# Patient Record
Sex: MALE | URBAN - METROPOLITAN AREA
[De-identification: names, ages, dates, MRNs, and addresses within clinical notes are randomized per-mention and may not be internally consistent; named-entity substitution may affect disease eponyms.]

---

## 2019-10-09 ENCOUNTER — EMERGENCY (EMERGENCY)
Facility: HOSPITAL | Age: 24
LOS: 1 days | End: 2019-10-09
Attending: EMERGENCY MEDICINE

## 2019-10-09 VITALS
HEIGHT: 68 IN | OXYGEN SATURATION: 99 % | HEART RATE: 59 BPM | DIASTOLIC BLOOD PRESSURE: 63 MMHG | SYSTOLIC BLOOD PRESSURE: 110 MMHG | RESPIRATION RATE: 16 BRPM | TEMPERATURE: 98 F | WEIGHT: 184.97 LBS

## 2019-10-09 NOTE — ED ADULT NURSE NOTE - OBJECTIVE STATEMENT
patient a/o x 4 with a calm affect states that he may or may not have had sexual intercourse with a partner who possibly had herpes and wants to know if he has them.  patient has no current outbreak

## 2020-04-15 ENCOUNTER — TRANSCRIPTION ENCOUNTER (OUTPATIENT)
Age: 25
End: 2020-04-15

## 2021-03-16 ENCOUNTER — TRANSCRIPTION ENCOUNTER (OUTPATIENT)
Age: 26
End: 2021-03-16

## 2021-05-12 ENCOUNTER — TRANSCRIPTION ENCOUNTER (OUTPATIENT)
Age: 26
End: 2021-05-12

## 2023-12-23 ENCOUNTER — EMERGENCY (EMERGENCY)
Facility: HOSPITAL | Age: 28
LOS: 1 days | Discharge: HOME | End: 2023-12-23
Attending: EMERGENCY MEDICINE | Admitting: EMERGENCY MEDICINE
Payer: COMMERCIAL

## 2023-12-23 VITALS
SYSTOLIC BLOOD PRESSURE: 110 MMHG | TEMPERATURE: 98 F | OXYGEN SATURATION: 98 % | DIASTOLIC BLOOD PRESSURE: 61 MMHG | RESPIRATION RATE: 14 BRPM | HEART RATE: 75 BPM

## 2023-12-23 VITALS
TEMPERATURE: 98 F | HEART RATE: 75 BPM | RESPIRATION RATE: 16 BRPM | HEIGHT: 69 IN | WEIGHT: 199.96 LBS | SYSTOLIC BLOOD PRESSURE: 108 MMHG | DIASTOLIC BLOOD PRESSURE: 66 MMHG | OXYGEN SATURATION: 98 %

## 2023-12-23 LAB
ALBUMIN SERPL ELPH-MCNC: 3.9 G/DL — SIGNIFICANT CHANGE UP (ref 3.3–5)
ALBUMIN SERPL ELPH-MCNC: 3.9 G/DL — SIGNIFICANT CHANGE UP (ref 3.3–5)
ALP SERPL-CCNC: 43 U/L — SIGNIFICANT CHANGE UP (ref 30–120)
ALP SERPL-CCNC: 43 U/L — SIGNIFICANT CHANGE UP (ref 30–120)
ALT FLD-CCNC: 27 U/L — SIGNIFICANT CHANGE UP (ref 10–60)
ALT FLD-CCNC: 27 U/L — SIGNIFICANT CHANGE UP (ref 10–60)
ANION GAP SERPL CALC-SCNC: 9 MMOL/L — SIGNIFICANT CHANGE UP (ref 5–17)
ANION GAP SERPL CALC-SCNC: 9 MMOL/L — SIGNIFICANT CHANGE UP (ref 5–17)
APTT BLD: 27.2 SEC — SIGNIFICANT CHANGE UP (ref 24.5–35.6)
APTT BLD: 27.2 SEC — SIGNIFICANT CHANGE UP (ref 24.5–35.6)
AST SERPL-CCNC: 15 U/L — SIGNIFICANT CHANGE UP (ref 10–40)
AST SERPL-CCNC: 15 U/L — SIGNIFICANT CHANGE UP (ref 10–40)
BASOPHILS # BLD AUTO: 0.03 K/UL — SIGNIFICANT CHANGE UP (ref 0–0.2)
BASOPHILS # BLD AUTO: 0.03 K/UL — SIGNIFICANT CHANGE UP (ref 0–0.2)
BASOPHILS NFR BLD AUTO: 0.4 % — SIGNIFICANT CHANGE UP (ref 0–2)
BASOPHILS NFR BLD AUTO: 0.4 % — SIGNIFICANT CHANGE UP (ref 0–2)
BILIRUB SERPL-MCNC: 0.4 MG/DL — SIGNIFICANT CHANGE UP (ref 0.2–1.2)
BILIRUB SERPL-MCNC: 0.4 MG/DL — SIGNIFICANT CHANGE UP (ref 0.2–1.2)
BUN SERPL-MCNC: 18 MG/DL — SIGNIFICANT CHANGE UP (ref 7–23)
BUN SERPL-MCNC: 18 MG/DL — SIGNIFICANT CHANGE UP (ref 7–23)
CALCIUM SERPL-MCNC: 9.4 MG/DL — SIGNIFICANT CHANGE UP (ref 8.4–10.5)
CALCIUM SERPL-MCNC: 9.4 MG/DL — SIGNIFICANT CHANGE UP (ref 8.4–10.5)
CHLORIDE SERPL-SCNC: 104 MMOL/L — SIGNIFICANT CHANGE UP (ref 96–108)
CHLORIDE SERPL-SCNC: 104 MMOL/L — SIGNIFICANT CHANGE UP (ref 96–108)
CK SERPL-CCNC: 190 U/L — SIGNIFICANT CHANGE UP (ref 39–308)
CK SERPL-CCNC: 190 U/L — SIGNIFICANT CHANGE UP (ref 39–308)
CO2 SERPL-SCNC: 29 MMOL/L — SIGNIFICANT CHANGE UP (ref 22–31)
CO2 SERPL-SCNC: 29 MMOL/L — SIGNIFICANT CHANGE UP (ref 22–31)
CREAT SERPL-MCNC: 1.09 MG/DL — SIGNIFICANT CHANGE UP (ref 0.5–1.3)
CREAT SERPL-MCNC: 1.09 MG/DL — SIGNIFICANT CHANGE UP (ref 0.5–1.3)
EGFR: 94 ML/MIN/1.73M2 — SIGNIFICANT CHANGE UP
EGFR: 94 ML/MIN/1.73M2 — SIGNIFICANT CHANGE UP
EOSINOPHIL # BLD AUTO: 0.15 K/UL — SIGNIFICANT CHANGE UP (ref 0–0.5)
EOSINOPHIL # BLD AUTO: 0.15 K/UL — SIGNIFICANT CHANGE UP (ref 0–0.5)
EOSINOPHIL NFR BLD AUTO: 1.8 % — SIGNIFICANT CHANGE UP (ref 0–6)
EOSINOPHIL NFR BLD AUTO: 1.8 % — SIGNIFICANT CHANGE UP (ref 0–6)
GLUCOSE SERPL-MCNC: 101 MG/DL — HIGH (ref 70–99)
GLUCOSE SERPL-MCNC: 101 MG/DL — HIGH (ref 70–99)
HCT VFR BLD CALC: 47.5 % — SIGNIFICANT CHANGE UP (ref 39–50)
HCT VFR BLD CALC: 47.5 % — SIGNIFICANT CHANGE UP (ref 39–50)
HGB BLD-MCNC: 15.3 G/DL — SIGNIFICANT CHANGE UP (ref 13–17)
HGB BLD-MCNC: 15.3 G/DL — SIGNIFICANT CHANGE UP (ref 13–17)
IMM GRANULOCYTES NFR BLD AUTO: 0.5 % — SIGNIFICANT CHANGE UP (ref 0–0.9)
IMM GRANULOCYTES NFR BLD AUTO: 0.5 % — SIGNIFICANT CHANGE UP (ref 0–0.9)
INR BLD: 0.98 RATIO — SIGNIFICANT CHANGE UP (ref 0.85–1.18)
INR BLD: 0.98 RATIO — SIGNIFICANT CHANGE UP (ref 0.85–1.18)
LYMPHOCYTES # BLD AUTO: 1.99 K/UL — SIGNIFICANT CHANGE UP (ref 1–3.3)
LYMPHOCYTES # BLD AUTO: 1.99 K/UL — SIGNIFICANT CHANGE UP (ref 1–3.3)
LYMPHOCYTES # BLD AUTO: 24.4 % — SIGNIFICANT CHANGE UP (ref 13–44)
LYMPHOCYTES # BLD AUTO: 24.4 % — SIGNIFICANT CHANGE UP (ref 13–44)
MCHC RBC-ENTMCNC: 28 PG — SIGNIFICANT CHANGE UP (ref 27–34)
MCHC RBC-ENTMCNC: 28 PG — SIGNIFICANT CHANGE UP (ref 27–34)
MCHC RBC-ENTMCNC: 32.2 GM/DL — SIGNIFICANT CHANGE UP (ref 32–36)
MCHC RBC-ENTMCNC: 32.2 GM/DL — SIGNIFICANT CHANGE UP (ref 32–36)
MCV RBC AUTO: 87 FL — SIGNIFICANT CHANGE UP (ref 80–100)
MCV RBC AUTO: 87 FL — SIGNIFICANT CHANGE UP (ref 80–100)
MONOCYTES # BLD AUTO: 0.82 K/UL — SIGNIFICANT CHANGE UP (ref 0–0.9)
MONOCYTES # BLD AUTO: 0.82 K/UL — SIGNIFICANT CHANGE UP (ref 0–0.9)
MONOCYTES NFR BLD AUTO: 10.1 % — SIGNIFICANT CHANGE UP (ref 2–14)
MONOCYTES NFR BLD AUTO: 10.1 % — SIGNIFICANT CHANGE UP (ref 2–14)
NEUTROPHILS # BLD AUTO: 5.12 K/UL — SIGNIFICANT CHANGE UP (ref 1.8–7.4)
NEUTROPHILS # BLD AUTO: 5.12 K/UL — SIGNIFICANT CHANGE UP (ref 1.8–7.4)
NEUTROPHILS NFR BLD AUTO: 62.8 % — SIGNIFICANT CHANGE UP (ref 43–77)
NEUTROPHILS NFR BLD AUTO: 62.8 % — SIGNIFICANT CHANGE UP (ref 43–77)
NRBC # BLD: 0 /100 WBCS — SIGNIFICANT CHANGE UP (ref 0–0)
NRBC # BLD: 0 /100 WBCS — SIGNIFICANT CHANGE UP (ref 0–0)
PLATELET # BLD AUTO: 227 K/UL — SIGNIFICANT CHANGE UP (ref 150–400)
PLATELET # BLD AUTO: 227 K/UL — SIGNIFICANT CHANGE UP (ref 150–400)
POTASSIUM SERPL-MCNC: 4.5 MMOL/L — SIGNIFICANT CHANGE UP (ref 3.5–5.3)
POTASSIUM SERPL-MCNC: 4.5 MMOL/L — SIGNIFICANT CHANGE UP (ref 3.5–5.3)
POTASSIUM SERPL-SCNC: 4.5 MMOL/L — SIGNIFICANT CHANGE UP (ref 3.5–5.3)
POTASSIUM SERPL-SCNC: 4.5 MMOL/L — SIGNIFICANT CHANGE UP (ref 3.5–5.3)
PROT SERPL-MCNC: 6.7 G/DL — SIGNIFICANT CHANGE UP (ref 6–8.3)
PROT SERPL-MCNC: 6.7 G/DL — SIGNIFICANT CHANGE UP (ref 6–8.3)
PROTHROM AB SERPL-ACNC: 10.7 SEC — SIGNIFICANT CHANGE UP (ref 9.5–13)
PROTHROM AB SERPL-ACNC: 10.7 SEC — SIGNIFICANT CHANGE UP (ref 9.5–13)
RBC # BLD: 5.46 M/UL — SIGNIFICANT CHANGE UP (ref 4.2–5.8)
RBC # BLD: 5.46 M/UL — SIGNIFICANT CHANGE UP (ref 4.2–5.8)
RBC # FLD: 12.9 % — SIGNIFICANT CHANGE UP (ref 10.3–14.5)
RBC # FLD: 12.9 % — SIGNIFICANT CHANGE UP (ref 10.3–14.5)
SODIUM SERPL-SCNC: 142 MMOL/L — SIGNIFICANT CHANGE UP (ref 135–145)
SODIUM SERPL-SCNC: 142 MMOL/L — SIGNIFICANT CHANGE UP (ref 135–145)
WBC # BLD: 8.15 K/UL — SIGNIFICANT CHANGE UP (ref 3.8–10.5)
WBC # BLD: 8.15 K/UL — SIGNIFICANT CHANGE UP (ref 3.8–10.5)
WBC # FLD AUTO: 8.15 K/UL — SIGNIFICANT CHANGE UP (ref 3.8–10.5)
WBC # FLD AUTO: 8.15 K/UL — SIGNIFICANT CHANGE UP (ref 3.8–10.5)

## 2023-12-23 PROCEDURE — 85025 COMPLETE CBC W/AUTO DIFF WBC: CPT

## 2023-12-23 PROCEDURE — 93970 EXTREMITY STUDY: CPT

## 2023-12-23 PROCEDURE — 80053 COMPREHEN METABOLIC PANEL: CPT

## 2023-12-23 PROCEDURE — 85730 THROMBOPLASTIN TIME PARTIAL: CPT

## 2023-12-23 PROCEDURE — 93970 EXTREMITY STUDY: CPT | Mod: 26

## 2023-12-23 PROCEDURE — 85610 PROTHROMBIN TIME: CPT

## 2023-12-23 PROCEDURE — 82550 ASSAY OF CK (CPK): CPT

## 2023-12-23 PROCEDURE — 99285 EMERGENCY DEPT VISIT HI MDM: CPT

## 2023-12-23 PROCEDURE — 36415 COLL VENOUS BLD VENIPUNCTURE: CPT

## 2023-12-23 PROCEDURE — 99284 EMERGENCY DEPT VISIT MOD MDM: CPT

## 2023-12-23 RX ORDER — APIXABAN 2.5 MG/1
1 TABLET, FILM COATED ORAL
Qty: 60 | Refills: 0
Start: 2023-12-23

## 2023-12-23 RX ORDER — APIXABAN 2.5 MG/1
10 TABLET, FILM COATED ORAL ONCE
Refills: 0 | Status: COMPLETED | OUTPATIENT
Start: 2023-12-23 | End: 2023-12-23

## 2023-12-23 RX ADMIN — APIXABAN 10 MILLIGRAM(S): 2.5 TABLET, FILM COATED ORAL at 12:22

## 2023-12-23 NOTE — ED ADULT NURSE NOTE - DISCHARGE DATE/TIME
[FreeTextEntry1] : Impression: Nonspecific right lower quadrant pain associated with transient change in bowel habits, some dyspeptic symptoms appears to be resolving spontaneously.  CAT scan negative.  Previous CT read as diverticulitis in retrospect was a self-limited inflammatory process, colonoscopy normal.  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  Given family history of colon cancer patient should have a colonoscopy every 5 years with his next interval in late 2027 23-Dec-2023 15:31 23-Dec-2023 16:20

## 2023-12-23 NOTE — ED PROVIDER NOTE - PROGRESS NOTE DETAILS
brianne/jude hagen ([facility]) she relates ok to start eliquis they will f/u in office. brianne/jude ivey ([facility]) he requests eliquis and hold in er, he will see this afternoon brianne/jude hagen ([facility]) she relates ok to start eliquis they will f/u in office. brianne/jude ivey ([facility]) he requests eliquis and hold in er, he will see this afternoon uche ([facility]) seen eval pt, requests d/c with eliquis and f/u in office. Reevaluated patient at bedside.  Patient feeling well.  Discussed the results of all diagnostic testing in ED and copies of all reports given.   An opportunity to ask questions was given.  Discussed the importance of prompt, close medical follow-up.  Patient will return with any changes, concerns or persistent / worsening symptoms.  Understanding of all instructions verbalized. uche ([facility]) seen eval pt, requests d/c with eliquis and f/u in office. Reevaluated patient at bedside.  Patient feeling well.  Discussed the results of all diagnostic testing in ED and copies of all reports given.   An opportunity to ask questions was given.  Discussed the importance of prompt, close medical follow-up.  Patient will return with any changes, concerns or persistent / worsening symptoms.  Understanding of all instructions verbalized.

## 2023-12-23 NOTE — ED PROVIDER NOTE - OBJECTIVE STATEMENT
Patient with history of protein S deficiency complaining of 1 to 2 days of calf pain status post travel to [place] and [facility].  Patient denies fevers chills trauma weakness numbness chest pain shortness of breath palpitations or any other complaints.  No history of PE or DVT.  PMD none currently Patient with history of protein S deficiency complaining of 1 to 2 days of calf pain status post travel to [place] and [facility].  Patient denies fevers chills trauma weakness numbness chest pain shortness of breath palpitations or any other complaints.  No history of PE or DVT.  PMD none currently

## 2023-12-23 NOTE — ED PROVIDER NOTE - DIFFERENTIAL DIAGNOSIS
Differential Diagnosis Differential including but not limited to DVT sprain strain electrolyte abnormality dehydration

## 2023-12-23 NOTE — ED PROVIDER NOTE - PATIENT PORTAL LINK FT
You can access the FollowMyHealth Patient Portal offered by [facility] by registering at the following website: [email or web address]. By joining [facility]’s FollowMyHealth portal, you will also be able to view your health information using other applications (apps) compatible with our system. You can access the FollowMyHealth Patient Portal offered by [facility] by registering at the following website: [email or web address]. By joining [facility]’s FollowMyHealth portal, you will also be able to view your health information using other applications (apps) compatible with our system.

## 2023-12-23 NOTE — CONSULT NOTE ADULT - SUBJECTIVE AND OBJECTIVE BOX
Patient is a 28y old  Male who presents with a chief complaint of     HPI:  Patient with history of protein S deficiency complaining of 1 to 2 days of calf pain status post travel to [place] and back.  Patient denies fevers chills trauma weakness numbness chest pain shortness of breath palpitations or any other complaints.  No history of PE or DVT.  PMD none currently      PAST MEDICAL & SURGICAL HISTORY:  Protein S deficiency  No significant past surgical history     HEALTH ISSUES - PROBLEM Dx:  No pertinent past medical history [[id number]]  Protein S deficiency [D68.59]  No significant past surgical history [[id number]]    FAMILY HISTORY:  mother with hx Prot S Def. DVT and PE hx. on coumadin  Father with hx protS  Sister ProtS carrier with no thrombosis  Maternal aunt with DVT, PE  Maternal GM with DVT    [SOCIAL HISTORY: ]     smoking:  none currently     EtOH:  none currently     illicit drugs:  occ MJ, edible but occ smokes     occupation:       marital status:  single, no children     Other:       [ALLERGIES/INTOLERANCES:]  Allergies       No Known Allergies  Intolerances      [MEDICATIONS]  MEDICATIONS  (STANDING):      MEDICATIONS  (PRN):      [REVIEW OF SYSTEMS: ]  CONSTITUTIONAL: normal, no fever, no shakes, no chills   EYES: No eye pain, no visual disturbances, no discharge  ENMT:  no discharge  NECK: No pain, no stiffness  BREASTS: No pain, no masses, no nipple discharge  RESPIRATORY: No cough, no wheezing, no chills, no hemoptysis; No shortness of breath  CARDIOVASCULAR: No chest pain, no palpitations, no dizziness, no leg swelling  GASTROINTESTINAL: No abdominal, no epigastric pain. No nausea, no vomiting, no hematemesis; No diarrhea , no constipation. No melena, no hematochezia.  GENITOURINARY: No dysuria, no frequency, no hematuria, no incontinence  NEUROLOGICAL: No headaches, no memory loss, no loss of strength, no numbness, no tremors  SKIN: No itching, no burning, no rashes, no lesions   LYMPH NODES: No enlarged glands  ENDOCRINE: No heat or cold intolerance; No hair loss  MUSCULOSKELETAL: No joint pain or swelling; No muscle, no back, no extremity pain  PSYCHIATRIC: No depression, no anxiety, no mood swings, no difficulty sleeping  HEME/LYMPH: No easy bruising, no bleeding gums    [VITALS SIGNS 24hrs]  Vital Signs Last 24 Hrs  T(C): 36.7 (23 Dec 2023 16:24), Max: 36.8 (23 Dec 2023 12:00)  T(F): 98 (23 Dec 2023 16:24), Max: 98.3 (23 Dec 2023 12:00)  HR: 75 (23 Dec 2023 16:24) (71 - 75)  BP: 110/61 (23 Dec 2023 16:24) (104/58 - 110/61)  BP(mean): --  RR: 14 (23 Dec 2023 16:24) (14 - 16)  SpO2: 98% (23 Dec 2023 16:24) (96% - 98%)    Parameters below as of 23 Dec 2023 10:08  Patient On (Oxygen Delivery Method): room air    Daily Height in cm: 175.26 (23 Dec 2023 10:08)    Daily   I&O's Summary      [PHYSICAL EXAM]  GEN:   HEENT: normocephalic and atraumatic. EOMI. PERRL.    NECK: Supple.  No lymphadenopathy   LUNGS: Clear to auscultation.  HEART: S1S2 Regular rate and rhythm, no MRG  ABDOMEN: Soft, nontender, and nondistended.  Positive bowel sounds.    : No CVA tenderness  EXTREMITIES: Without edema.  NEUROLOGIC: grossly intact.  PSYCHIATRIC: Appropriate affect .  SKIN: No rash       [LABS: ]                        15.3   8.15  )-----------( 227      ( 23 Dec 2023 10:50 )             47.5     CBC Full  -  ( 23 Dec 2023 10:50 )  WBC Count : 8.15 K/uL  RBC Count : 5.46 M/uL  Hemoglobin : 15.3 g/dL  Hematocrit : 47.5 %  Platelet Count - Automated : 227 K/uL  Mean Cell Volume : 87.0 fl  Mean Cell Hemoglobin : 28.0 pg  Mean Cell Hemoglobin Concentration : 32.2 gm/dL  Auto Neutrophil # : 5.12 K/uL  Auto Lymphocyte # : 1.99 K/uL  Auto Monocyte # : 0.82 K/uL  Auto Eosinophil # : 0.15 K/uL  Auto Basophil # : 0.03 K/uL  Auto Neutrophil % : 62.8 %  Auto Lymphocyte % : 24.4 %  Auto Monocyte % : 10.1 %  Auto Eosinophil % : 1.8 %  Auto Basophil % : 0.4 %    12-23  142  |  104  |  18  ----------------------------<  101<H>  4.5   |  29  |  1.09  Ca    9.4      23 Dec 2023 10:50  TPro  6.7  /  Alb  3.9  /  TBili  0.4  /  DBili  x   /  AST  15  /  ALT  27  /  AlkPhos  43  12-23  PT/INR - ( 23 Dec 2023 10:50 )   PT: 10.7 sec;   INR: 0.98 ratio    PTT - ( 23 Dec 2023 10:50 )  PTT:27.2 sec  LIVER FUNCTIONS - ( 23 Dec 2023 10:50 )  Alb: 3.9 g/dL / Pro: 6.7 g/dL / ALK PHOS: 43 U/L / ALT: 27 U/L / AST: 15 U/L / GGT: x           CARDIAC MARKERS ( 23 Dec 2023 10:50 )  x     / x     / 190 U/L / x     / x        Urinalysis Basic - ( 23 Dec 2023 10:50 )  Color: x / Appearance: x / SG: x / pH: x  Gluc: 101 mg/dL / Ketone: x  / Bili: x / Urobili: x   Blood: x / Protein: x / Nitrite: x   Leuk Esterase: x / RBC: x / WBC x   Sq Epi: x / Non Sq Epi: x / Bacteria: x    CBC TREND (5 Days)  WBC Count: 8.15 K/uL (12-23 @ 10:50)  Hemoglobin: 15.3 g/dL (12-23 @ 10:50)  Hematocrit: 47.5 % (12-23 @ 10:50)  Platelet Count - Automated: 227 K/uL (12-23 @ 10:50)        [MICROBIOLOGY /  VIROLOGY:]      [PATHOLOGY]       [RADIOLOGY & ADDITIONAL STUDIES:]       US Duplex Venous Lower Ext Complete, Bilateral:   ACC: [id number] EXAM:  US DPLX LWR EXT VEINS COMPL BI   ORDERED BY: NEVILLE SQUIRES   PROCEDURE DATE:  12/23/2023    INTERPRETATION:  CLINICAL INFORMATION: Right lower extremity calf pain  COMPARISON: None available.  TECHNIQUE: Duplex sonography of the BILATERAL LOWER extremity veins with color and spectral Doppler, with and without compression.  FINDINGS:  RIGHT:  Normal compressibility of the RIGHT common femoral, femoral and popliteal veins.  Doppler examination shows normal spontaneous and phasic flow.  In the area patient's symptomatology there is an acute thrombus likely within the soleal vein  LEFT:  Normal compressibility of the LEFT common femoral, femoral and popliteal   veins.  Doppler examination shows normal spontaneous and phasic flow.  No LEFT calf vein thrombosis is detected.    IMPRESSION:      Right soleal vein thrombus without proximal extension        --- End of Report ---            PASHA CRUMP MD; Attending Radiologist  This document has been electronically signed. Dec 23 2023 11:05AM (12-23-23 @ 11:03)   Patient is a 28y old  Male who presents with a chief complaint of     HPI:  Patient with history of protein S deficiency complaining of 1 to 2 days of calf pain status post travel to [place] and back.  Patient denies fevers chills trauma weakness numbness chest pain shortness of breath palpitations or any other complaints.  No history of PE or DVT.  PMD none currently      PAST MEDICAL & SURGICAL HISTORY:  Protein S deficiency  No significant past surgical history     HEALTH ISSUES - PROBLEM Dx:  No pertinent past medical history [[id number]]  Protein S deficiency [D68.59]  No significant past surgical history [[id number]]    FAMILY HISTORY:  mother with hx Prot S Def. DVT and PE hx. on coumadin  Father with hx protS  Sister ProtS carrier with no thrombosis  Maternal aunt with DVT, PE  Maternal GM with DVT    [SOCIAL HISTORY: ]     smoking:  none currently     EtOH:  none currently     illicit drugs:  occ MJ, edible but occ smokes     occupation:       marital status:  single, no children     Other:       [ALLERGIES/INTOLERANCES:]  Allergies       No Known Allergies  Intolerances      [MEDICATIONS]  MEDICATIONS  (STANDING):      MEDICATIONS  (PRN):      [REVIEW OF SYSTEMS: ]  CONSTITUTIONAL: normal, no fever, no shakes, no chills   EYES: No eye pain, no visual disturbances, no discharge  ENMT:  no discharge  NECK: No pain, no stiffness  BREASTS: No pain, no masses, no nipple discharge  RESPIRATORY: No cough, no wheezing, no chills, no hemoptysis; No shortness of breath  CARDIOVASCULAR: No chest pain, no palpitations, no dizziness, no leg swelling  GASTROINTESTINAL: No abdominal, no epigastric pain. No nausea, no vomiting, no hematemesis; No diarrhea , no constipation. No melena, no hematochezia.  GENITOURINARY: No dysuria, no frequency, no hematuria, no incontinence  NEUROLOGICAL: No headaches, no memory loss, no loss of strength, no numbness, no tremors  SKIN: No itching, no burning, no rashes, no lesions   LYMPH NODES: No enlarged glands  ENDOCRINE: No heat or cold intolerance; No hair loss  MUSCULOSKELETAL: No joint pain or swelling; No muscle, no back, no extremity pain  PSYCHIATRIC: No depression, no anxiety, no mood swings, no difficulty sleeping  HEME/LYMPH: No easy bruising, no bleeding gums    [VITALS SIGNS 24hrs]  Vital Signs Last 24 Hrs  T(C): 36.7 (23 Dec 2023 16:24), Max: 36.8 (23 Dec 2023 12:00)  T(F): 98 (23 Dec 2023 16:24), Max: 98.3 (23 Dec 2023 12:00)  HR: 75 (23 Dec 2023 16:24) (71 - 75)  BP: 110/61 (23 Dec 2023 16:24) (104/58 - 110/61)  BP(mean): --  RR: 14 (23 Dec 2023 16:24) (14 - 16)  SpO2: 98% (23 Dec 2023 16:24) (96% - 98%)    Parameters below as of 23 Dec 2023 10:08  Patient On (Oxygen Delivery Method): room air    Daily Height in cm: 175.26 (23 Dec 2023 10:08)    Daily   I&O's Summary      [PHYSICAL EXAM]  GEN:   HEENT: normocephalic and atraumatic. EOMI. PERRL.    NECK: Supple.  No lymphadenopathy   LUNGS: Clear to auscultation.  HEART: S1S2 Regular rate and rhythm, no MRG  ABDOMEN: Soft, nontender, and nondistended.  Positive bowel sounds.    : No CVA tenderness  EXTREMITIES: Without edema.  NEUROLOGIC: grossly intact.  PSYCHIATRIC: Appropriate affect .  SKIN: No rash       [LABS: ]                        15.3   8.15  )-----------( 227      ( 23 Dec 2023 10:50 )             47.5     CBC Full  -  ( 23 Dec 2023 10:50 )  WBC Count : 8.15 K/uL  RBC Count : 5.46 M/uL  Hemoglobin : 15.3 g/dL  Hematocrit : 47.5 %  Platelet Count - Automated : 227 K/uL  Mean Cell Volume : 87.0 fl  Mean Cell Hemoglobin : 28.0 pg  Mean Cell Hemoglobin Concentration : 32.2 gm/dL  Auto Neutrophil # : 5.12 K/uL  Auto Lymphocyte # : 1.99 K/uL  Auto Monocyte # : 0.82 K/uL  Auto Eosinophil # : 0.15 K/uL  Auto Basophil # : 0.03 K/uL  Auto Neutrophil % : 62.8 %  Auto Lymphocyte % : 24.4 %  Auto Monocyte % : 10.1 %  Auto Eosinophil % : 1.8 %  Auto Basophil % : 0.4 %    12-23  142  |  104  |  18  ----------------------------<  101<H>  4.5   |  29  |  1.09  Ca    9.4      23 Dec 2023 10:50  TPro  6.7  /  Alb  3.9  /  TBili  0.4  /  DBili  x   /  AST  15  /  ALT  27  /  AlkPhos  43  12-23  PT/INR - ( 23 Dec 2023 10:50 )   PT: 10.7 sec;   INR: 0.98 ratio    PTT - ( 23 Dec 2023 10:50 )  PTT:27.2 sec  LIVER FUNCTIONS - ( 23 Dec 2023 10:50 )  Alb: 3.9 g/dL / Pro: 6.7 g/dL / ALK PHOS: 43 U/L / ALT: 27 U/L / AST: 15 U/L / GGT: x           CARDIAC MARKERS ( 23 Dec 2023 10:50 )  x     / x     / 190 U/L / x     / x        Urinalysis Basic - ( 23 Dec 2023 10:50 )  Color: x / Appearance: x / SG: x / pH: x  Gluc: 101 mg/dL / Ketone: x  / Bili: x / Urobili: x   Blood: x / Protein: x / Nitrite: x   Leuk Esterase: x / RBC: x / WBC x   Sq Epi: x / Non Sq Epi: x / Bacteria: x    CBC TREND (5 Days)  WBC Count: 8.15 K/uL (12-23 @ 10:50)  Hemoglobin: 15.3 g/dL (12-23 @ 10:50)  Hematocrit: 47.5 % (12-23 @ 10:50)  Platelet Count - Automated: 227 K/uL (12-23 @ 10:50)        [MICROBIOLOGY /  VIROLOGY:]      [PATHOLOGY]       [RADIOLOGY & ADDITIONAL STUDIES:]       US Duplex Venous Lower Ext Complete, Bilateral:   ACC: [id number] EXAM:  US DPLX LWR EXT VEINS COMPL BI   ORDERED BY: NEVILLE SQUIRES   PROCEDURE DATE:  12/23/2023    INTERPRETATION:  CLINICAL INFORMATION: Right lower extremity calf pain  COMPARISON: None available.  TECHNIQUE: Duplex sonography of the BILATERAL LOWER extremity veins with color and spectral Doppler, with and without compression.  FINDINGS:  RIGHT:  Normal compressibility of the RIGHT common femoral, femoral and popliteal veins.  Doppler examination shows normal spontaneous and phasic flow.  In the area patient's symptomatology there is an acute thrombus likely within the soleal vein  LEFT:  Normal compressibility of the LEFT common femoral, femoral and popliteal   veins.  Doppler examination shows normal spontaneous and phasic flow.  No LEFT calf vein thrombosis is detected.    IMPRESSION:      Right soleal vein thrombus without proximal extension        --- End of Report ---            PASHA CRUMP MD; Attending Radiologist  This document has been electronically signed. Dec 23 2023 11:05AM (12-23-23 @ 11:03)

## 2023-12-23 NOTE — CONSULT NOTE ADULT - ASSESSMENT
[ASSESSMENT and  PLAN]  Protein S deficiency [D68.59]  DVT R soleal Vein  [I82.492]     DVT provoked due to recent viral illness, possible COVID 1m prior.   Underlying hx of Prot S Def [tested 9yrs ago due to family hx of such]  On HCG injections,   and recent travel, drive to [place] with 14hr road trip, each way.     No CP, no sn/sx for PE.   BP and HR stable.     RECOMMENDATIONS    Start Eliquis 10mg q12h for 7d  then change to Eliquis 5mg q12 thereafter to complete 3mo of anticoagulation.   Thereafter dose-de-esclation or on demand prophylaxis     Pt will remain at increased risk for VTE long term.   Mitigation strategies discussed.   Abstain smoking  Needs eval w hematology prior to surgeries.     No impact sports while on AC.   Avoid stomping jumping, running in short term.   Exercise as tolerate. Mild to moderate exercise OK.     on HCG injections  recommend cessation to reduce risk for recurrent VTE    Discussed plan of care with patient and or family in detail.   Pt/Family expressed understanding of the treatment plan.   Risks, benefits and alternatives discussed in detail.   Opportunity given for questions and discussion.   Questions or concerns all addressed and answered to their satisfaction, and in lay terms.     Discussed with Dr Campo.  Thank you for consulting us.     > 57 minutes spent in direct patient care, examining and counseling patient,  reviewing  the notes, lab data/ imaging , discussion with multidisciplinary team.

## 2023-12-23 NOTE — ED PROVIDER NOTE - NSFOLLOWUPINSTRUCTIONS_ED_ALL_ED_FT
Deep Vein Thrombosis  A person's legs with close-ups showing a normal vein, a vein with a blood clot, and a blood clot that breaks loose.  Deep vein thrombosis (DVT) is a condition in which a blood clot forms in a vein of the deep venous system. This can occur in the lower leg, thigh, pelvis, arm, or neck. A clot is blood that has thickened into a gel or solid. This condition is serious and can be life-threatening if the clot travels to the arteries of the lungs and causes a blockage (pulmonary embolism). A DVT can also damage veins in the leg, which can lead to long-term venous disease, leg pain, swelling, discoloration, and ulcers or sores (post-thrombotic syndrome).    What are the causes?  This condition may be caused by:  A slowdown of blood flow.  Damage to a vein.  A condition that causes blood to clot more easily, such as certain bleeding disorders.  What increases the risk?  The following factors may make you more likely to develop this condition:  Obesity.  Being older, especially older than age 60.  Being inactive or not moving around (sedentary lifestyle). This may include:  Sitting or lying down for longer than 4–6 hours other than to sleep at night.  Being in the hospital, or having major or lengthy surgery.  Having any recent bone injuries, such as breaks (fractures), that reduce movement, especially in the lower extremities.  Having recent orthopedic surgery on the lower extremities.  Being pregnant, giving birth, or having recently given birth.  Taking medicines that contain estrogen, such as birth control or hormone replacement therapy.  Using products that contain nicotine or tobacco, especially if you use hormonal birth control.  Having a history of a blood vessel disease (peripheral vascular disease) or congestive heart disease.  Having a history of cancer, especially if being treated with chemotherapy.  What are the signs or symptoms?  Symptoms of this condition include:  Swelling, pain, pressure, or tenderness in an arm or a leg.  An arm or a leg becoming warm, red, or discolored.  A leg turning very pale or blue. You may have a large DVT. This is rare.  If the clot is in your leg, you may notice that symptoms get worse when you stand or walk.    In some cases, there are no symptoms.    How is this diagnosed?  This condition is diagnosed with:  Your medical history and a physical exam.  Tests, such as:  Blood tests to check how well your blood clots.  Doppler ultrasound. This is the best way to find a DVT.  CT venogram. Contrast dye is injected into a vein, and X-rays are taken to check for clots. This is helpful for veins in the chest or pelvis.  How is this treated?  Treatment for this condition depends on:  The cause of your DVT.  The size and location of your DVT, or having more than one DVT.  Your risk for bleeding or developing more clots.  Other medical conditions you may have.  Treatment may include:  Taking a blood thinner medicine (anticoagulant) to prevent more clots from forming or current clots from growing.  Wearing compression stockings.  Injecting medicines into the affected vein to break up the clot (catheter-directed thrombolysis).  Surgical procedures, when DVT is severe or hard to treat. These may be done to:  Isolate and remove your clot.  Place an inferior vena cava (IVC) filter. This filter is placed into a large vein called the inferior vena cava to catch blood clots before they reach your lungs.  You may get some medical treatments for 6 months or longer.    Follow these instructions at home:  If you are taking blood thinners:    Talk with your health care provider before you take any medicines that contain aspirin or NSAIDs, such as ibuprofen. These medicines increase your risk for dangerous bleeding.  Take your medicine exactly as told, at the same time every day. Do not skip a dose. Do not take more than the prescribed dose. This is important.  Ask your health care provider about foods and medicines that could change or interact with the way your blood thinner works. Avoid these foods and medicines if you are told to do so.  Avoid anything that may cause bleeding or bruising. You may bleed more easily while taking blood thinners.  Be very careful when using knives, scissors, or other sharp objects.  Use an electric razor instead of a blade.  Avoid activities that could cause injury or bruising, and follow instructions for preventing falls.  Tell your health care provider if you have had any internal bleeding, bleeding ulcers, or neurologic diseases, such as strokes or cerebral aneurysms.  Wear a medical alert bracelet or carry a card that lists what medicines you take.  General instructions    Take over-the-counter and prescription medicines only as told by your health care provider.  Return to your normal activities as told by your health care provider. Ask your health care provider what activities are safe for you.  If recommended, wear compression stockings as told by your health care provider. These stockings help to prevent blood clots and reduce swelling in your legs. Never wear your compression stockings while sleeping at night.  Keep all follow-up visits. This is important.  Where to find more information  American Heart Association: www.heart.org  Centers for Disease Control and Prevention: www.cdc.gov  National Heart, Lung, and Blood [place]: www.nhlbi.nih.gov  Contact a health care provider if:  You miss a dose of your blood thinner.  You have unusual bruising or other color changes.  You have new or worse pain, swelling, or redness in an arm or a leg.  You have worsening numbness or tingling in an arm or a leg.  You have a significant color change (pale or blue) in the extremity that has the DVT.  Get help right away if:  You have signs or symptoms that a blood clot has moved to the lungs. These may include:  Shortness of breath.  Chest pain.  Fast or irregular heartbeats (palpitations).  Light-headedness, dizziness, or fainting.  Coughing up blood.  You have signs or symptoms that your blood is too thin. These may include:  Blood in your vomit, stool, or urine.  A cut that will not stop bleeding.  A menstrual period that is heavier than usual.  A severe headache or confusion.  These symptoms may be an emergency. Get help right away. Call 911.  Do not wait to see if the symptoms will go away.  Do not drive yourself to the hospital.  Summary  Deep vein thrombosis (DVT) happens when a blood clot forms in a deep vein. This may occur in the lower leg, thigh, pelvis, arm, or neck.  Symptoms affect the arm or leg and can include swelling, pain, tenderness, warmth, redness, or discoloration.  This condition may be treated with medicines. In severe cases, a procedure or surgery may be done to remove or dissolve the clots.  If you are taking blood thinners, take them exactly as told. Do not skip a dose. Do not take more than is prescribed.  Get help right away if you have a severe headache, shortness of breath, chest pain, fast or irregular heartbeats, or blood in your vomit, urine, or stool.  This information is not intended to replace advice given to you by your health care provider. Make sure you discuss any questions you have with your health care provider. Deep Vein Thrombosis  A person's legs with close-ups showing a normal vein, a vein with a blood clot, and a blood clot that breaks loose.  Deep vein thrombosis (DVT) is a condition in which a blood clot forms in a vein of the deep venous system. This can occur in the lower leg, thigh, pelvis, arm, or neck. A clot is blood that has thickened into a gel or solid. This condition is serious and can be life-threatening if the clot travels to the arteries of the lungs and causes a blockage (pulmonary embolism). A DVT can also damage veins in the leg, which can lead to long-term venous disease, leg pain, swelling, discoloration, and ulcers or sores (post-thrombotic syndrome).    What are the causes?  This condition may be caused by:  A slowdown of blood flow.  Damage to a vein.  A condition that causes blood to clot more easily, such as certain bleeding disorders.  What increases the risk?  The following factors may make you more likely to develop this condition:  Obesity.  Being older, especially older than age 60.  Being inactive or not moving around (sedentary lifestyle). This may include:  Sitting or lying down for longer than 4–6 hours other than to sleep at night.  Being in the hospital, or having major or lengthy surgery.  Having any recent bone injuries, such as breaks (fractures), that reduce movement, especially in the lower extremities.  Having recent orthopedic surgery on the lower extremities.  Being pregnant, giving birth, or having recently given birth.  Taking medicines that contain estrogen, such as birth control or hormone replacement therapy.  Using products that contain nicotine or tobacco, especially if you use hormonal birth control.  Having a history of a blood vessel disease (peripheral vascular disease) or congestive heart disease.  Having a history of cancer, especially if being treated with chemotherapy.  What are the signs or symptoms?  Symptoms of this condition include:  Swelling, pain, pressure, or tenderness in an arm or a leg.  An arm or a leg becoming warm, red, or discolored.  A leg turning very pale or blue. You may have a large DVT. This is rare.  If the clot is in your leg, you may notice that symptoms get worse when you stand or walk.    In some cases, there are no symptoms.    How is this diagnosed?  This condition is diagnosed with:  Your medical history and a physical exam.  Tests, such as:  Blood tests to check how well your blood clots.  Doppler ultrasound. This is the best way to find a DVT.  CT venogram. Contrast dye is injected into a vein, and X-rays are taken to check for clots. This is helpful for veins in the chest or pelvis.  How is this treated?  Treatment for this condition depends on:  The cause of your DVT.  The size and location of your DVT, or having more than one DVT.  Your risk for bleeding or developing more clots.  Other medical conditions you may have.  Treatment may include:  Taking a blood thinner medicine (anticoagulant) to prevent more clots from forming or current clots from growing.  Wearing compression stockings.  Injecting medicines into the affected vein to break up the clot (catheter-directed thrombolysis).  Surgical procedures, when DVT is severe or hard to treat. These may be done to:  Isolate and remove your clot.  Place an inferior vena cava (IVC) filter. This filter is placed into a large vein called the inferior vena cava to catch blood clots before they reach your lungs.  You may get some medical treatments for 6 months or longer.    Follow these instructions at home:  If you are taking blood thinners:    Talk with your health care provider before you take any medicines that contain aspirin or NSAIDs, such as ibuprofen. These medicines increase your risk for dangerous bleeding.  Take your medicine exactly as told, at the same time every day. Do not skip a dose. Do not take more than the prescribed dose. This is important.  Ask your health care provider about foods and medicines that could change or interact with the way your blood thinner works. Avoid these foods and medicines if you are told to do so.  Avoid anything that may cause bleeding or bruising. You may bleed more easily while taking blood thinners.  Be very careful when using knives, scissors, or other sharp objects.  Use an electric razor instead of a blade.  Avoid activities that could cause injury or bruising, and follow instructions for preventing falls.  Tell your health care provider if you have had any internal bleeding, bleeding ulcers, or neurologic diseases, such as strokes or cerebral aneurysms.  Wear a medical alert bracelet or carry a card that lists what medicines you take.  General instructions    Take over-the-counter and prescription medicines only as told by your health care provider.  Return to your normal activities as told by your health care provider. Ask your health care provider what activities are safe for you.  If recommended, wear compression stockings as told by your health care provider. These stockings help to prevent blood clots and reduce swelling in your legs. Never wear your compression stockings while sleeping at night.  Keep all follow-up visits. This is important.  Where to find more information  American Heart Association: www.heart.org  Centers for Disease Control and Prevention: www.cdc.gov  National Heart, Lung, and Blood [place]: www.nhlbi.nih.gov  Contact a health care provider if:  You miss a dose of your blood thinner.  You have unusual bruising or other color changes.  You have new or worse pain, swelling, or redness in an arm or a leg.  You have worsening numbness or tingling in an arm or a leg.  You have a significant color change (pale or blue) in the extremity that has the DVT.  Get help right away if:  You have signs or symptoms that a blood clot has moved to the lungs. These may include:  Shortness of breath.  Chest pain.  Fast or irregular heartbeats (palpitations).  Light-headedness, dizziness, or fainting.  Coughing up blood.  You have signs or symptoms that your blood is too thin. These may include:  Blood in your vomit, stool, or urine.  A cut that will not stop bleeding.  A menstrual period that is heavier than usual.  A severe headache or confusion.  These symptoms may be an emergency. Get help right away. Call 911.  Do not wait to see if the symptoms will go away.  Do not drive yourself to the hospital.  Summary  Deep vein thrombosis (DVT) happens when a blood clot forms in a deep vein. This may occur in the lower leg, thigh, pelvis, arm, or neck.  Symptoms affect the arm or leg and can include swelling, pain, tenderness, warmth, redness, or discoloration.  This condition may be treated with medicines. In severe cases, a procedure or surgery may be done to remove or dissolve the clots.  If you are taking blood thinners, take them exactly as told. Do not skip a dose. Do not take more than is prescribed.  Get help right away if you have a severe headache, shortness of breath, chest pain, fast or irregular heartbeats, or blood in your vomit, urine, or stool.  This information is not intended to replace advice given to you by your health care provider. Make sure you discuss any questions you have with your health care provider.

## 2023-12-23 NOTE — ED PROVIDER NOTE - PROVIDER TOKENS
PROVIDER:[TOKEN:[[id number]],FOLLOWUP:[1-3 Days]],PROVIDER:[TOKEN:[[id number]],FOLLOWUP:[1-3 Days]] PROVIDER:[TOKEN:[[id number]],FOLLOWUP:[1-3 Days]],PROVIDER:[TOKEN:[[id number]],FOLLOWUP:[1-3 Days]]

## 2023-12-23 NOTE — ED PROVIDER NOTE - CLINICAL SUMMARY MEDICAL DECISION MAKING FREE TEXT BOX
Patient with history of protein S deficiency complaining of 1 to 2 days of calf pain status post travel to [place] and back.  Patient denies fevers chills trauma weakness numbness chest pain shortness of breath palpitations or any other complaints.  No history of PE or DVT.  PMD none currently    [facility] lower extremity Doppler    Differential including but not limited to DVT sprain strain electrolyte abnormality dehydration

## 2023-12-23 NOTE — ED PROVIDER NOTE - CARE PROVIDER_API CALL
Hon Darien Rosen  Medical Oncology  [address], [address]  [place] [address]  Phone: [phone]  Fax: [phone]  Follow Up Time: 1-3 Days    Radha Fenton  Vascular Surgery  [address], [address]  [place] [address]  Phone: [phone]  Fax: [phone]  Follow Up Time: 1-3 Days   Hon Darien Rosen  Medical Oncology  [address], [address]  [place] [address]  Phone: [phone]  Fax: [phone]  Follow Up Time: 1-3 Days    Radha Fenton  Vascular Surgery  [address], [address]  [place] [address]  Phone: [phone]  Fax: [phone]  Follow Up Time: 1-3 Days

## 2023-12-23 NOTE — ED ADULT NURSE NOTE - NSFALLUNIVINTERV_ED_ALL_ED
Bed/Stretcher in lowest position, wheels locked, appropriate side rails in place/Call bell, personal items and telephone in reach/Instruct patient to call for assistance before getting out of bed/chair/stretcher/Non-slip footwear applied when patient is off stretcher/[place] to call system/Physically safe environment - no spills, clutter or unnecessary equipment/Purposeful proactive rounding/Room/bathroom lighting operational, light cord in reach Bed/Stretcher in lowest position, wheels locked, appropriate side rails in place/Call bell, personal items and telephone in reach/Instruct patient to call for assistance before getting out of bed/chair/stretcher/Non-slip footwear applied when patient is off stretcher/[place] to call system/Physically safe environment - no spills, clutter or unnecessary equipment/Purposeful proactive rounding/Room/bathroom lighting operational, light cord in reach

## 2024-02-08 ENCOUNTER — OUTPATIENT (OUTPATIENT)
Dept: OUTPATIENT SERVICES | Facility: HOSPITAL | Age: 29
LOS: 1 days | End: 2024-02-08
Payer: COMMERCIAL

## 2024-02-08 ENCOUNTER — APPOINTMENT (OUTPATIENT)
Dept: ULTRASOUND IMAGING | Facility: CLINIC | Age: 29
End: 2024-02-08
Payer: COMMERCIAL

## 2024-02-08 DIAGNOSIS — I82.492 ACUTE EMBOLISM AND THROMBOSIS OF OTHER SPECIFIED DEEP VEIN OF LEFT LOWER EXTREMITY: ICD-10-CM

## 2024-02-08 DIAGNOSIS — D68.59 OTHER PRIMARY THROMBOPHILIA: ICD-10-CM

## 2024-02-08 PROCEDURE — 93970 EXTREMITY STUDY: CPT | Mod: 26

## 2024-02-08 PROCEDURE — 93970 EXTREMITY STUDY: CPT

## 2024-03-07 ENCOUNTER — OUTPATIENT (OUTPATIENT)
Dept: OUTPATIENT SERVICES | Facility: HOSPITAL | Age: 29
LOS: 1 days | Discharge: HOME | End: 2024-03-07

## 2024-03-07 DIAGNOSIS — I82.409 ACUTE EMBOLISM AND THROMBOSIS OF UNSPECIFIED DEEP VEINS OF UNSPECIFIED LOWER EXTREMITY: ICD-10-CM

## 2024-03-15 ENCOUNTER — APPOINTMENT (OUTPATIENT)
Dept: HEMATOLOGY ONCOLOGY | Facility: CLINIC | Age: 29
End: 2024-03-15

## 2024-04-15 DIAGNOSIS — Z00.00 ENCOUNTER FOR GENERAL ADULT MEDICAL EXAMINATION W/OUT ABNORMAL FINDINGS: ICD-10-CM

## 2024-04-16 ENCOUNTER — NON-APPOINTMENT (OUTPATIENT)
Age: 29
End: 2024-04-16

## 2024-04-17 ENCOUNTER — APPOINTMENT (OUTPATIENT)
Dept: HEMATOLOGY ONCOLOGY | Facility: CLINIC | Age: 29
End: 2024-04-17
Payer: COMMERCIAL

## 2024-04-17 ENCOUNTER — RESULT REVIEW (OUTPATIENT)
Age: 29
End: 2024-04-17

## 2024-04-17 VITALS
DIASTOLIC BLOOD PRESSURE: 81 MMHG | HEART RATE: 96 BPM | RESPIRATION RATE: 16 BRPM | OXYGEN SATURATION: 96 % | WEIGHT: 199.28 LBS | SYSTOLIC BLOOD PRESSURE: 149 MMHG | TEMPERATURE: 98.5 F | HEIGHT: 68.54 IN | BODY MASS INDEX: 29.86 KG/M2

## 2024-04-17 DIAGNOSIS — Z82.49 FAMILY HISTORY OF ISCHEMIC HEART DISEASE AND OTHER DISEASES OF THE CIRCULATORY SYSTEM: ICD-10-CM

## 2024-04-17 DIAGNOSIS — Z83.2 FAMILY HISTORY OF DISEASES OF THE BLOOD AND BLOOD-FORMING ORGANS AND CERTAIN DISORDERS INVOLVING THE IMMUNE MECHANISM: ICD-10-CM

## 2024-04-17 DIAGNOSIS — I82.491 ACUTE EMBOLISM AND THROMBOSIS OF OTHER SPECIFIED DEEP VEIN OF RIGHT LOWER EXTREMITY: ICD-10-CM

## 2024-04-17 DIAGNOSIS — H33.21 SEROUS RETINAL DETACHMENT, RIGHT EYE: ICD-10-CM

## 2024-04-17 DIAGNOSIS — D75.1 SECONDARY POLYCYTHEMIA: ICD-10-CM

## 2024-04-17 DIAGNOSIS — D68.59 OTHER PRIMARY THROMBOPHILIA: ICD-10-CM

## 2024-04-17 DIAGNOSIS — Z80.3 FAMILY HISTORY OF MALIGNANT NEOPLASM OF BREAST: ICD-10-CM

## 2024-04-17 DIAGNOSIS — Z78.9 OTHER SPECIFIED HEALTH STATUS: ICD-10-CM

## 2024-04-17 LAB
BASOPHILS # BLD AUTO: 0.04 K/UL — SIGNIFICANT CHANGE UP (ref 0–0.2)
BASOPHILS NFR BLD AUTO: 0.5 % — SIGNIFICANT CHANGE UP (ref 0–2)
EOSINOPHIL # BLD AUTO: 0.16 K/UL — SIGNIFICANT CHANGE UP (ref 0–0.5)
EOSINOPHIL NFR BLD AUTO: 2.1 % — SIGNIFICANT CHANGE UP (ref 0–6)
HCT VFR BLD CALC: 47.2 % — SIGNIFICANT CHANGE UP (ref 39–50)
HGB BLD-MCNC: 16.2 G/DL — SIGNIFICANT CHANGE UP (ref 13–17)
IMM GRANULOCYTES NFR BLD AUTO: 0.4 % — SIGNIFICANT CHANGE UP (ref 0–0.9)
LYMPHOCYTES # BLD AUTO: 2.57 K/UL — SIGNIFICANT CHANGE UP (ref 1–3.3)
LYMPHOCYTES # BLD AUTO: 34.5 % — SIGNIFICANT CHANGE UP (ref 13–44)
MCHC RBC-ENTMCNC: 29 PG — SIGNIFICANT CHANGE UP (ref 27–34)
MCHC RBC-ENTMCNC: 34.3 G/DL — SIGNIFICANT CHANGE UP (ref 32–36)
MCV RBC AUTO: 84.6 FL — SIGNIFICANT CHANGE UP (ref 80–100)
MONOCYTES # BLD AUTO: 0.64 K/UL — SIGNIFICANT CHANGE UP (ref 0–0.9)
MONOCYTES NFR BLD AUTO: 8.6 % — SIGNIFICANT CHANGE UP (ref 2–14)
NEUTROPHILS # BLD AUTO: 4.02 K/UL — SIGNIFICANT CHANGE UP (ref 1.8–7.4)
NEUTROPHILS NFR BLD AUTO: 53.9 % — SIGNIFICANT CHANGE UP (ref 43–77)
NRBC # BLD: 0 /100 WBCS — SIGNIFICANT CHANGE UP (ref 0–0)
PLATELET # BLD AUTO: 235 K/UL — SIGNIFICANT CHANGE UP (ref 150–400)
RBC # BLD: 5.58 M/UL — SIGNIFICANT CHANGE UP (ref 4.2–5.8)
RBC # FLD: 12.5 % — SIGNIFICANT CHANGE UP (ref 10.3–14.5)
WBC # BLD: 7.46 K/UL — SIGNIFICANT CHANGE UP (ref 3.8–10.5)
WBC # FLD AUTO: 7.46 K/UL — SIGNIFICANT CHANGE UP (ref 3.8–10.5)

## 2024-04-17 PROCEDURE — G2211 COMPLEX E/M VISIT ADD ON: CPT

## 2024-04-17 PROCEDURE — 99205 OFFICE O/P NEW HI 60 MIN: CPT

## 2024-04-17 PROCEDURE — 99417 PROLNG OP E/M EACH 15 MIN: CPT

## 2024-04-18 LAB
APTT BLD: 31.8 SEC
HCYS SERPL-MCNC: 8.1 UMOL/L
INR PPP: 1.05 RATIO
PT BLD: 12 SEC

## 2024-04-19 LAB
AT III PPP CHRO-ACNC: 97 %
B2 GLYCOPROT1 AB SER QL: NEGATIVE
CARDIOLIPIN AB SER IA-ACNC: NEGATIVE
CONFIRM: 31.1 SEC
DRVVT IMM 1:2 NP PPP: ABNORMAL
DRVVT SCREEN TO CONFIRM RATIO: 1.47 RATIO
PROT C PPP CHRO-ACNC: 119 %
SCREEN DRVVT: 54 SEC
SILICA CLOTTING TIME INTERPRETATION: NORMAL
SILICA CLOTTING TIME S/C: 1.15 RATIO

## 2024-04-22 LAB
DNA PLOIDY SPEC FC-IMP: NORMAL
PTR INTERP: NORMAL

## 2024-04-23 PROBLEM — Z80.3 FAMILY HISTORY OF MALIGNANT NEOPLASM OF FEMALE BREAST: Status: ACTIVE | Noted: 2024-04-23

## 2024-04-23 PROBLEM — Z82.49 FAMILY HISTORY OF PULMONARY EMBOLISM: Status: ACTIVE | Noted: 2024-04-23

## 2024-04-23 PROBLEM — H33.21 RIGHT RETINAL DETACHMENT: Status: RESOLVED | Noted: 2024-04-23 | Resolved: 2024-04-23

## 2024-04-23 PROBLEM — D68.59: Status: ACTIVE | Noted: 2024-04-17

## 2024-04-23 PROBLEM — D75.1 ERYTHROCYTOSIS: Status: ACTIVE | Noted: 2024-04-23

## 2024-04-23 PROBLEM — Z78.9 SOCIAL ALCOHOL USE: Status: ACTIVE | Noted: 2024-04-23

## 2024-04-23 PROBLEM — I82.491 ACUTE DEEP VEIN THROMBOSIS (DVT) OF OTHER SPECIFIED VEIN OF RIGHT LOWER EXTREMITY: Status: ACTIVE | Noted: 2024-04-17

## 2024-04-23 PROBLEM — Z83.2 FAMILY HISTORY OF PROTEIN S DEFICIENCY: Status: ACTIVE | Noted: 2024-04-23

## 2024-04-23 PROBLEM — Z82.49 FAMILY HISTORY OF DEEP VENOUS THROMBOSIS: Status: ACTIVE | Noted: 2024-04-23

## 2024-04-23 RX ORDER — BERBERINE CHLOR/SEAWEED/CHROM 500-250 MG
500 CAPSULE ORAL
Refills: 0 | Status: ACTIVE | COMMUNITY
Start: 2024-04-23

## 2024-04-23 RX ORDER — DOXYCYCLINE HYCLATE 100 MG/1
100 CAPSULE ORAL DAILY
Refills: 0 | Status: ACTIVE | COMMUNITY
Start: 2024-04-23

## 2024-04-23 RX ORDER — RIVAROXABAN 20 MG/1
20 TABLET, FILM COATED ORAL
Qty: 90 | Refills: 3 | Status: ACTIVE | COMMUNITY
Start: 2024-04-23

## 2024-04-23 RX ORDER — TESTOSTERONE CYPIONATE 100 MG/ML
100 INJECTION, SOLUTION INTRAMUSCULAR
Refills: 0 | Status: ACTIVE | COMMUNITY
Start: 2024-04-23

## 2024-04-23 NOTE — REVIEW OF SYSTEMS
[Negative] : Heme/Lymph [Vision Problems] : vision problems [Diarrhea: Grade 0] : Diarrhea: Grade 0 [Negative] : Allergic/Immunologic

## 2024-04-23 NOTE — PHYSICAL EXAM
[Fully active, able to carry on all pre-disease performance without restriction] : Status 0 - Fully active, able to carry on all pre-disease performance without restriction [Normal] : affect appropriate [de-identified] : Testes benign [de-identified] : Acne on back

## 2024-04-23 NOTE — PHYSICAL EXAM
[Fully active, able to carry on all pre-disease performance without restriction] : Status 0 - Fully active, able to carry on all pre-disease performance without restriction [Normal] : affect appropriate [de-identified] : Testes benign [de-identified] : Acne on back

## 2024-04-23 NOTE — REASON FOR VISIT
[Follow-Up Visit] : a follow-up visit for [Initial Consultation] : an initial consultation for [Coagulopathy] : coagulopathy

## 2024-04-23 NOTE — HISTORY OF PRESENT ILLNESS
[de-identified] : Protein S deficiency 12/2023 Right gastrocnemius DVT - provoked Erythrocytosis = on testosterone [de-identified] : 28 year old male requesting second opinion consultation regarding protein S deficiency. His mother is my patient and has a history of deep venous thrombophlebitis and protein S deficiency. In the Fall of 2023, he had a COVID-like illness. In December,2023 following a long uninterrupted automobile trip from [place], he developed right calf pain. Doppler examination revealed a right soleal DVT (later corrected to gastrocnemius). He was anticoagulated with Eliquis. Of note, he was being treated with HCG and testosterone injections. His hemoglobin was 15.3. He did not take Eliquis as directed. He frequently took it only once daily. In Febraury,2024 he was switched to Xarelto once daily.  At that time, hemoglobin was 16.1, testosterone 1,322, protein S free antigen 45%, protein S activity 31%. Repeat Doppler exam revealed a stable right gastrocnemius DVT. During this period of time, he was phlebotomized once secondary to his erythrocytosis.   He feels better. He notes no leg pain/swelling, chest pain, SOB, bleeding, nor bruising.   He has no other history of thrombosis. His family history is strongly positive for protein S deficiency and deep venous thrombophlebitis on his maternal side.

## 2024-04-23 NOTE — REVIEW OF SYSTEMS
[Vision Problems] : vision problems [Diarrhea: Grade 0] : Diarrhea: Grade 0 [Negative] : Allergic/Immunologic

## 2024-04-23 NOTE — HISTORY OF PRESENT ILLNESS
[de-identified] : Protein S deficiency 12/2023 Right gastrocnemius DVT - provoked Erythrocytosis = on testosterone [de-identified] : 28 year old male requesting second opinion consultation regarding protein S deficiency. His mother is my patient and has a history of deep venous thrombophlebitis and protein S deficiency. In the Fall of 2023, he had a COVID-like illness. In December,2023 following a long uninterrupted automobile trip from [place], he developed right calf pain. Doppler examination revealed a right soleal DVT (later corrected to gastrocnemius). He was anticoagulated with Eliquis. Of note, he was being treated with HCG and testosterone injections. His hemoglobin was 15.3. He did not take Eliquis as directed. He frequently took it only once daily. In Febraury,2024 he was switched to Xarelto once daily.  At that time, hemoglobin was 16.1, testosterone 1,322, protein S free antigen 45%, protein S activity 31%. Repeat Doppler exam revealed a stable right gastrocnemius DVT. During this period of time, he was phlebotomized once secondary to his erythrocytosis.   He feels better. He notes no leg pain/swelling, chest pain, SOB, bleeding, nor bruising.   He has no other history of thrombosis. His family history is strongly positive for protein S deficiency and deep venous thrombophlebitis on his maternal side.

## 2024-04-25 NOTE — ADDENDUM
[FreeTextEntry1] : I, Austin Fenton, acted solely as a scribe for Dr. Deion Arellano on 4/17/2023. All medical entries made by the Scribe were at my, Dr. Deion Arellano's, direction and personally dictated by me on 4/17/2023. I have reviewed the chart and agree that the record accurately reflects my personal performance of the history, physical exam, assessment and plan. I have also personally directed, reviewed, and agreed with the chart.

## 2024-04-25 NOTE — ASSESSMENT
[Palliative Care Plan] : not applicable at this time [FreeTextEntry1] : 28 year old male with  Plan:

## 2024-07-17 ENCOUNTER — HOSPITAL ENCOUNTER (EMERGENCY)
Dept: HOSPITAL 87 - ER | Age: 29
Discharge: HOME | End: 2024-07-17
Payer: COMMERCIAL

## 2024-07-17 VITALS — DIASTOLIC BLOOD PRESSURE: 70 MMHG | OXYGEN SATURATION: 100 % | SYSTOLIC BLOOD PRESSURE: 106 MMHG | TEMPERATURE: 98.3 F

## 2024-07-17 VITALS — HEIGHT: 68 IN | BODY MASS INDEX: 30.41 KG/M2 | WEIGHT: 200.62 LBS

## 2024-07-17 VITALS — RESPIRATION RATE: 16 BRPM | HEART RATE: 74 BPM

## 2024-07-17 DIAGNOSIS — M79.18: Primary | ICD-10-CM

## 2024-07-17 DIAGNOSIS — Z98.890: ICD-10-CM

## 2024-07-17 DIAGNOSIS — Z88.6: ICD-10-CM

## 2024-07-17 DIAGNOSIS — Z88.5: ICD-10-CM

## 2024-07-17 LAB
ALBUMIN SERPL BCP-MCNC: 5.4 G/DL (ref 3.2–4.8)
ALT SERPL W P-5'-P-CCNC: 36 IU/L (ref 10–49)
AST SERPL W P-5'-P-CCNC: 35 IU/L (ref ?–34)
BILIRUB SERPL-MCNC: 0.6 MG/DL (ref 0.1–1)
BUN SERPL-MCNC: 19 MG/DL (ref 9–23)
CALCIUM SERPL-MCNC: 10.2 MG/DL (ref 8.7–10.4)
CHLORIDE SERPL-SCNC: 104 MEQ/L (ref 98–107)
CO2 SERPL-SCNC: 29 MEQ/L (ref 21–32)
CREAT SERPL-MCNC: 1 MG/DL (ref 0.6–1.3)
ERYTHROCYTE [DISTWIDTH] IN BLOOD BY AUTOMATED COUNT: 13.1 % (ref 11.6–14.6)
GLUCOSE SERPL-MCNC: 86 MG/DL (ref 70–105)
HCT VFR BLD AUTO: 50.8 % (ref 42–52)
HGB BLD-MCNC: 16.9 G/DL (ref 14–18)
MCH RBC QN AUTO: 28.2 PG (ref 28–32)
MCHC RBC AUTO-ENTMCNC: 33.2 G/DL (ref 31–37)
MCV RBC AUTO: 84.9 FL (ref 80–94)
PLATELET # BLD AUTO: 221 X1000/UL (ref 130–400)
POTASSIUM SERPL-SCNC: 4 MEQ/L (ref 3.5–5.1)
PROT SERPL-MCNC: 7.7 G/DL (ref 6–8.3)
RBC # BLD AUTO: 5.98 MILL/UL (ref 4.7–6.1)
SODIUM SERPL-SCNC: 139 MEQ/L (ref 136–145)
WBC # BLD: 7.7 X1000/UL (ref 4.5–11)

## 2024-07-17 PROCEDURE — 85027 COMPLETE CBC AUTOMATED: CPT

## 2024-07-17 PROCEDURE — 93970 EXTREMITY STUDY: CPT

## 2024-07-17 PROCEDURE — 36415 COLL VENOUS BLD VENIPUNCTURE: CPT

## 2024-07-17 PROCEDURE — 99284 EMERGENCY DEPT VISIT MOD MDM: CPT

## 2024-07-17 PROCEDURE — 80053 COMPREHEN METABOLIC PANEL: CPT

## 2024-11-21 ENCOUNTER — RESULT REVIEW (OUTPATIENT)
Age: 29
End: 2024-11-21

## 2024-11-21 ENCOUNTER — APPOINTMENT (OUTPATIENT)
Dept: HEMATOLOGY ONCOLOGY | Facility: CLINIC | Age: 29
End: 2024-11-21

## 2024-11-21 ENCOUNTER — OUTPATIENT (OUTPATIENT)
Dept: OUTPATIENT SERVICES | Facility: HOSPITAL | Age: 29
LOS: 1 days | End: 2024-11-21
Payer: COMMERCIAL

## 2024-11-21 ENCOUNTER — APPOINTMENT (OUTPATIENT)
Dept: ULTRASOUND IMAGING | Facility: IMAGING CENTER | Age: 29
End: 2024-11-21
Payer: COMMERCIAL

## 2024-11-21 ENCOUNTER — OUTPATIENT (OUTPATIENT)
Dept: OUTPATIENT SERVICES | Facility: HOSPITAL | Age: 29
LOS: 1 days | Discharge: HOME | End: 2024-11-21

## 2024-11-21 ENCOUNTER — APPOINTMENT (OUTPATIENT)
Dept: HEMATOLOGY ONCOLOGY | Facility: CLINIC | Age: 29
End: 2024-11-21
Payer: COMMERCIAL

## 2024-11-21 VITALS
TEMPERATURE: 98.7 F | OXYGEN SATURATION: 97 % | WEIGHT: 203.91 LBS | DIASTOLIC BLOOD PRESSURE: 73 MMHG | BODY MASS INDEX: 30.52 KG/M2 | SYSTOLIC BLOOD PRESSURE: 108 MMHG | HEART RATE: 88 BPM | RESPIRATION RATE: 17 BRPM

## 2024-11-21 DIAGNOSIS — I82.491 ACUTE EMBOLISM AND THROMBOSIS OF OTHER SPECIFIED DEEP VEIN OF RIGHT LOWER EXTREMITY: ICD-10-CM

## 2024-11-21 DIAGNOSIS — I82.409 ACUTE EMBOLISM AND THROMBOSIS OF UNSPECIFIED DEEP VEINS OF UNSPECIFIED LOWER EXTREMITY: ICD-10-CM

## 2024-11-21 LAB
BASOPHILS # BLD AUTO: 0.03 K/UL — SIGNIFICANT CHANGE UP (ref 0–0.2)
BASOPHILS # BLD AUTO: 0.04 K/UL — SIGNIFICANT CHANGE UP (ref 0–0.2)
BASOPHILS NFR BLD AUTO: 0.4 % — SIGNIFICANT CHANGE UP (ref 0–2)
BASOPHILS NFR BLD AUTO: 0.5 % — SIGNIFICANT CHANGE UP (ref 0–2)
EOSINOPHIL # BLD AUTO: 0.17 K/UL — SIGNIFICANT CHANGE UP (ref 0–0.5)
EOSINOPHIL # BLD AUTO: 0.23 K/UL — SIGNIFICANT CHANGE UP (ref 0–0.5)
EOSINOPHIL NFR BLD AUTO: 2.3 % — SIGNIFICANT CHANGE UP (ref 0–6)
EOSINOPHIL NFR BLD AUTO: 2.8 % — SIGNIFICANT CHANGE UP (ref 0–6)
HCT VFR BLD CALC: 46.1 % — SIGNIFICANT CHANGE UP (ref 39–50)
HCT VFR BLD CALC: 47.9 % — SIGNIFICANT CHANGE UP (ref 39–50)
HGB BLD-MCNC: 15.2 G/DL — SIGNIFICANT CHANGE UP (ref 13–17)
HGB BLD-MCNC: 16.2 G/DL — SIGNIFICANT CHANGE UP (ref 13–17)
IMM GRANULOCYTES NFR BLD AUTO: 0.1 % — SIGNIFICANT CHANGE UP (ref 0–0.9)
IMM GRANULOCYTES NFR BLD AUTO: 0.2 % — SIGNIFICANT CHANGE UP (ref 0–0.9)
LYMPHOCYTES # BLD AUTO: 2.12 K/UL — SIGNIFICANT CHANGE UP (ref 1–3.3)
LYMPHOCYTES # BLD AUTO: 2.38 K/UL — SIGNIFICANT CHANGE UP (ref 1–3.3)
LYMPHOCYTES # BLD AUTO: 29.2 % — SIGNIFICANT CHANGE UP (ref 13–44)
LYMPHOCYTES # BLD AUTO: 29.3 % — SIGNIFICANT CHANGE UP (ref 13–44)
MCHC RBC-ENTMCNC: 28.2 PG — SIGNIFICANT CHANGE UP (ref 27–34)
MCHC RBC-ENTMCNC: 28.5 PG — SIGNIFICANT CHANGE UP (ref 27–34)
MCHC RBC-ENTMCNC: 33 G/DL — SIGNIFICANT CHANGE UP (ref 32–36)
MCHC RBC-ENTMCNC: 33.8 G/DL — SIGNIFICANT CHANGE UP (ref 32–36)
MCV RBC AUTO: 83.3 FL — SIGNIFICANT CHANGE UP (ref 80–100)
MCV RBC AUTO: 86.3 FL — SIGNIFICANT CHANGE UP (ref 80–100)
MONOCYTES # BLD AUTO: 0.64 K/UL — SIGNIFICANT CHANGE UP (ref 0–0.9)
MONOCYTES # BLD AUTO: 0.67 K/UL — SIGNIFICANT CHANGE UP (ref 0–0.9)
MONOCYTES NFR BLD AUTO: 8.2 % — SIGNIFICANT CHANGE UP (ref 2–14)
MONOCYTES NFR BLD AUTO: 8.8 % — SIGNIFICANT CHANGE UP (ref 2–14)
NEUTROPHILS # BLD AUTO: 4.3 K/UL — SIGNIFICANT CHANGE UP (ref 1.8–7.4)
NEUTROPHILS # BLD AUTO: 4.79 K/UL — SIGNIFICANT CHANGE UP (ref 1.8–7.4)
NEUTROPHILS NFR BLD AUTO: 59 % — SIGNIFICANT CHANGE UP (ref 43–77)
NEUTROPHILS NFR BLD AUTO: 59.2 % — SIGNIFICANT CHANGE UP (ref 43–77)
NRBC # BLD: 0 /100 WBCS — SIGNIFICANT CHANGE UP (ref 0–0)
NRBC # BLD: 0 /100 WBCS — SIGNIFICANT CHANGE UP (ref 0–0)
NRBC BLD-RTO: 0 /100 WBCS — SIGNIFICANT CHANGE UP (ref 0–0)
NRBC BLD-RTO: 0 /100 WBCS — SIGNIFICANT CHANGE UP (ref 0–0)
PLATELET # BLD AUTO: 211 K/UL — SIGNIFICANT CHANGE UP (ref 150–400)
PLATELET # BLD AUTO: 221 K/UL — SIGNIFICANT CHANGE UP (ref 150–400)
RBC # BLD: 5.34 M/UL — SIGNIFICANT CHANGE UP (ref 4.2–5.8)
RBC # BLD: 5.75 M/UL — SIGNIFICANT CHANGE UP (ref 4.2–5.8)
RBC # FLD: 12.2 % — SIGNIFICANT CHANGE UP (ref 10.3–14.5)
RBC # FLD: 12.2 % — SIGNIFICANT CHANGE UP (ref 10.3–14.5)
WBC # BLD: 7.27 K/UL — SIGNIFICANT CHANGE UP (ref 3.8–10.5)
WBC # BLD: 8.13 K/UL — SIGNIFICANT CHANGE UP (ref 3.8–10.5)
WBC # FLD AUTO: 7.27 K/UL — SIGNIFICANT CHANGE UP (ref 3.8–10.5)
WBC # FLD AUTO: 8.13 K/UL — SIGNIFICANT CHANGE UP (ref 3.8–10.5)

## 2024-11-21 PROCEDURE — 93971 EXTREMITY STUDY: CPT

## 2024-11-21 PROCEDURE — 99213 OFFICE O/P EST LOW 20 MIN: CPT

## 2024-11-21 PROCEDURE — 93971 EXTREMITY STUDY: CPT | Mod: 26,RT

## 2024-11-21 RX ORDER — CEPHALEXIN 500 MG/1
500 CAPSULE ORAL 4 TIMES DAILY
Qty: 40 | Refills: 0 | Status: ACTIVE | COMMUNITY
Start: 2024-11-21 | End: 1900-01-01

## 2024-11-21 RX ORDER — CLOMIPHENE CITRATE 50 MG/1
50 TABLET ORAL
Refills: 0 | Status: ACTIVE | COMMUNITY
Start: 2024-11-21

## 2024-11-22 ENCOUNTER — NON-APPOINTMENT (OUTPATIENT)
Age: 29
End: 2024-11-22

## 2024-11-22 LAB
APTT BLD: 28.7 SEC
B2 GLYCOPROT1 IGA SERPL IA-ACNC: <2 U/ML
B2 GLYCOPROT1 IGG SER-ACNC: <1.4 U/ML
B2 GLYCOPROT1 IGM SER-ACNC: <1.5 U/ML
CARDIOLIPIN IGM SER-MCNC: <1.5 MPL U/ML
CARDIOLIPIN IGM SER-MCNC: <1.6 GPL U/ML
CONFIRM: 27.3 SEC
DEPRECATED CARDIOLIPIN IGA SER: <2 APL U/ML
DRVVT IMM 1:2 NP PPP: NORMAL
DRVVT SCREEN TO CONFIRM RATIO: 1.18 RATIO
INR PPP: 0.97 RATIO
PT BLD: 11.5 SEC
SCREEN DRVVT: 41.4 SEC
SILICA CLOTTING TIME INTERPRETATION: ABNORMAL
SILICA CLOTTING TIME S/C: 1.25 RATIO

## 2024-11-26 ENCOUNTER — APPOINTMENT (OUTPATIENT)
Dept: HEMATOLOGY ONCOLOGY | Facility: CLINIC | Age: 29
End: 2024-11-26
Payer: COMMERCIAL

## 2024-11-26 ENCOUNTER — RESULT REVIEW (OUTPATIENT)
Age: 29
End: 2024-11-26

## 2024-11-26 VITALS
SYSTOLIC BLOOD PRESSURE: 99 MMHG | BODY MASS INDEX: 30.49 KG/M2 | DIASTOLIC BLOOD PRESSURE: 58 MMHG | TEMPERATURE: 98 F | HEART RATE: 77 BPM | OXYGEN SATURATION: 98 % | RESPIRATION RATE: 16 BRPM | WEIGHT: 203.71 LBS

## 2024-11-26 DIAGNOSIS — I80.8 PHLEBITIS AND THROMBOPHLEBITIS OF OTHER SITES: ICD-10-CM

## 2024-11-26 DIAGNOSIS — I82.491 ACUTE EMBOLISM AND THROMBOSIS OF OTHER SPECIFIED DEEP VEIN OF RIGHT LOWER EXTREMITY: ICD-10-CM

## 2024-11-26 DIAGNOSIS — D68.59 OTHER PRIMARY THROMBOPHILIA: ICD-10-CM

## 2024-11-26 LAB
BASOPHILS # BLD AUTO: 0.03 K/UL — SIGNIFICANT CHANGE UP (ref 0–0.2)
BASOPHILS NFR BLD AUTO: 0.4 % — SIGNIFICANT CHANGE UP (ref 0–2)
EOSINOPHIL # BLD AUTO: 0.28 K/UL — SIGNIFICANT CHANGE UP (ref 0–0.5)
EOSINOPHIL NFR BLD AUTO: 4 % — SIGNIFICANT CHANGE UP (ref 0–6)
HCT VFR BLD CALC: 47.1 % — SIGNIFICANT CHANGE UP (ref 39–50)
HGB BLD-MCNC: 15.9 G/DL — SIGNIFICANT CHANGE UP (ref 13–17)
IMM GRANULOCYTES NFR BLD AUTO: 1.1 % — HIGH (ref 0–0.9)
LYMPHOCYTES # BLD AUTO: 2.05 K/UL — SIGNIFICANT CHANGE UP (ref 1–3.3)
LYMPHOCYTES # BLD AUTO: 29.1 % — SIGNIFICANT CHANGE UP (ref 13–44)
MCHC RBC-ENTMCNC: 28.3 PG — SIGNIFICANT CHANGE UP (ref 27–34)
MCHC RBC-ENTMCNC: 33.8 G/DL — SIGNIFICANT CHANGE UP (ref 32–36)
MCV RBC AUTO: 83.8 FL — SIGNIFICANT CHANGE UP (ref 80–100)
MONOCYTES # BLD AUTO: 0.52 K/UL — SIGNIFICANT CHANGE UP (ref 0–0.9)
MONOCYTES NFR BLD AUTO: 7.4 % — SIGNIFICANT CHANGE UP (ref 2–14)
NEUTROPHILS # BLD AUTO: 4.08 K/UL — SIGNIFICANT CHANGE UP (ref 1.8–7.4)
NEUTROPHILS NFR BLD AUTO: 58 % — SIGNIFICANT CHANGE UP (ref 43–77)
NRBC # BLD: 0 /100 WBCS — SIGNIFICANT CHANGE UP (ref 0–0)
NRBC BLD-RTO: 0 /100 WBCS — SIGNIFICANT CHANGE UP (ref 0–0)
PLATELET # BLD AUTO: 216 K/UL — SIGNIFICANT CHANGE UP (ref 150–400)
RBC # BLD: 5.62 M/UL — SIGNIFICANT CHANGE UP (ref 4.2–5.8)
RBC # FLD: 12 % — SIGNIFICANT CHANGE UP (ref 10.3–14.5)
WBC # BLD: 7.04 K/UL — SIGNIFICANT CHANGE UP (ref 3.8–10.5)
WBC # FLD AUTO: 7.04 K/UL — SIGNIFICANT CHANGE UP (ref 3.8–10.5)

## 2024-11-26 PROCEDURE — G2211 COMPLEX E/M VISIT ADD ON: CPT | Mod: NC

## 2024-11-26 PROCEDURE — 99215 OFFICE O/P EST HI 40 MIN: CPT

## 2024-11-26 RX ORDER — ASPIRIN ENTERIC COATED TABLETS 81 MG 81 MG/1
81 TABLET, DELAYED RELEASE ORAL DAILY
Refills: 0 | Status: ACTIVE | COMMUNITY
Start: 2024-11-26

## 2024-11-26 RX ORDER — ADAPALENE AND BENZOYL PEROXIDE 3; 25 MG/G; MG/G
0.3-2.5 GEL TOPICAL
Qty: 45 | Refills: 0 | Status: ACTIVE | COMMUNITY
Start: 2024-11-12
